# Patient Record
Sex: MALE | Race: BLACK OR AFRICAN AMERICAN | Employment: PART TIME | URBAN - METROPOLITAN AREA
[De-identification: names, ages, dates, MRNs, and addresses within clinical notes are randomized per-mention and may not be internally consistent; named-entity substitution may affect disease eponyms.]

---
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
## 2023-02-25 ENCOUNTER — HOSPITAL ENCOUNTER (EMERGENCY)
Age: 23
Discharge: HOME | End: 2023-02-25
Attending: EMERGENCY MEDICINE
Payer: MEDICAID

## 2023-02-25 VITALS
WEIGHT: 280 LBS | BODY MASS INDEX: 37.11 KG/M2 | RESPIRATION RATE: 16 BRPM | DIASTOLIC BLOOD PRESSURE: 82 MMHG | SYSTOLIC BLOOD PRESSURE: 131 MMHG | OXYGEN SATURATION: 97 % | TEMPERATURE: 97.5 F | HEIGHT: 73 IN | HEART RATE: 76 BPM

## 2023-02-25 DIAGNOSIS — M67.40 GANGLION CYST: ICD-10-CM

## 2023-02-25 DIAGNOSIS — M25.532 LEFT WRIST PAIN: Primary | ICD-10-CM

## 2023-02-25 PROCEDURE — 6370000000 HC RX 637 (ALT 250 FOR IP): Performed by: STUDENT IN AN ORGANIZED HEALTH CARE EDUCATION/TRAINING PROGRAM

## 2023-02-25 PROCEDURE — 99283 EMERGENCY DEPT VISIT LOW MDM: CPT

## 2023-02-25 RX ORDER — ACETAMINOPHEN 500 MG
1000 TABLET ORAL ONCE
Status: COMPLETED | OUTPATIENT
Start: 2023-02-25 | End: 2023-02-25

## 2023-02-25 RX ORDER — IBUPROFEN 800 MG/1
800 TABLET ORAL ONCE
Status: COMPLETED | OUTPATIENT
Start: 2023-02-25 | End: 2023-02-25

## 2023-02-25 RX ADMIN — IBUPROFEN 800 MG: 800 TABLET, FILM COATED ORAL at 19:47

## 2023-02-25 RX ADMIN — ACETAMINOPHEN 1000 MG: 500 TABLET ORAL at 19:46

## 2023-02-25 ASSESSMENT — ENCOUNTER SYMPTOMS
ABDOMINAL PAIN: 0
VOMITING: 0
SHORTNESS OF BREATH: 0
DIARRHEA: 0
NAUSEA: 0

## 2023-02-26 NOTE — ED PROVIDER NOTES
[address] [facility]  ED     Emergency Department     Faculty Attestation    I performed a history and physical examination of the patient and discussed management with the resident. I reviewed the residents note and agree with the documented findings and plan of care. Any areas of disagreement are noted on the chart. I was personally present for the key portions of any procedures. I have documented in the chart those procedures where I was not present during the key portions. I have reviewed the emergency nurses triage note. I agree with the chief complaint, past medical history, past surgical history, allergies, medications, social and family history as documented unless otherwise noted below. For Physician Assistant/ Nurse Practitioner cases/documentation I have personally evaluated this patient and have completed at least one if not all key elements of the E/M (history, physical exam, and MDM). Additional findings are as noted.     Ganglion cyst left wrist otherwise normal wrist exam will discharge home      Critical Care     none    Emery Romero MD, Soniya Brower  Attending Emergency  Physician           Emery Romero MD  02/25/23 8796

## 2023-02-26 NOTE — DISCHARGE INSTRUCTIONS
Please take over-the-counter Tylenol and Motrin as needed for pain control. Please call and schedule appoint with her primary care provider as well as general surgery. This is likely a ganglion cyst.  Monitor for signs of any infection. If you develop any severe fevers, severe swelling, severe worsening or pain please return the emergency department.

## 2023-02-26 NOTE — ED PROVIDER NOTES
[facility] ED  Emergency Department Encounter  Emergency Medicine Resident     Pt Name:Cory Jacob  MRN: [id number]  [place] 2000  Date of evaluation: 2/25/23  PCP:  Percy Ji MD  Note Started: 7:45 PM EST      CHIEF COMPLAINT       Chief Complaint   Patient presents with    Wrist Pain     LEFT       HISTORY OF PRESENT ILLNESS  (Location/Symptom, Timing/Onset, Context/Setting, Quality, Duration, Modifying Factors, Severity.)      Akeem L Saintclair Kiang is a 23 y.o. male who presents with left wrist pain has been going on for the past few months to weeks. Patient denies any trauma. States he is no swelling of the dorsal aspect of his left wrist.  States pain is worse with movement. Denies any numbness or tingling upper extremities. States he is noted some swelling in the posterior aspect. Denies any history of diabetes. PAST MEDICAL / SURGICAL / SOCIAL / FAMILY HISTORY      has a past medical history of Anxiety. has no past surgical history on file. Social History     Socioeconomic History    Marital status: Single     Spouse name: Not on file    Number of children: Not on file    Years of education: Not on file    Highest education level: Not on file   Occupational History    Not on file   Tobacco Use    Smoking status: Never    Smokeless tobacco: Never   Substance and Sexual Activity    Alcohol use: Not on file    Drug use: No    Sexual activity: Not on file   Other Topics Concern    Not on file   Social History Narrative    Not on file     Social Determinants of Health     Financial Resource Strain: Not on file   Food Insecurity: Not on file   Transportation Needs: Not on file   Physical Activity: Not on file   Stress: Not on file   Social Connections: Not on file   Intimate Partner Violence: Not on file   Housing Stability: Not on file       History reviewed. No pertinent family history. Allergies:  Patient has no known allergies.     Home Medications:  Prior to Admission medications    Not on File         REVIEW OF SYSTEMS       Review of Systems   Constitutional:  Negative for chills and fever. Respiratory:  Negative for shortness of breath. Cardiovascular:  Negative for chest pain. Gastrointestinal:  Negative for abdominal pain, diarrhea, nausea and vomiting. Musculoskeletal:         Left posterior wrist pain   Neurological:  Negative for weakness and numbness. PHYSICAL EXAM      INITIAL VITALS:   /82   Pulse 76   Temp 97.5 °F (36.4 °C) (Oral)   Resp 16   Ht 6' 1\" (1.854 m)   Wt 280 lb (127 kg)   SpO2 97%   BMI 36.94 kg/m²     Physical Exam  Constitutional:       General: He is not in acute distress. Appearance: He is not ill-appearing, toxic-appearing or diaphoretic. Cardiovascular:      Rate and Rhythm: Normal rate and regular rhythm. Heart sounds: No murmur heard. No friction rub. No gallop. Pulmonary:      Effort: No respiratory distress. Breath sounds: No stridor. No wheezing, rhonchi or rales. Chest:      Chest wall: No tenderness. Abdominal:      General: There is no distension. Palpations: There is no mass. Tenderness: There is no abdominal tenderness. There is no guarding or rebound. Hernia: No hernia is present. Musculoskeletal:      Comments: Swelling to the posterior aspect of the left wrist, there is a slightly well-circumscribed slightly mobile mass likely cystic in nature, no overlying warmth erythema, no fluctuance, mildly tender palpation, no swelling of the joint space, neurovascular tact bilateral upper extremities   Skin:     Capillary Refill: Capillary refill takes less than 2 seconds. Coloration: Skin is not jaundiced or pale. Findings: No bruising, erythema, lesion or rash. Neurological:      Mental Status: He is oriented to person, place, and time.          DDX/DIAGNOSTIC RESULTS / EMERGENCY DEPARTMENT COURSE / MDM     Medical Decision Making  27-year-old male presenting with left wrist pain and swelling. There is no swelling of joint space normal warmth erythema. Neurovascular intact. Low suspicion for abscess. Likely ganglion cyst.  No trauma no negation for x-ray imaging. Differential diagnosis of ganglion cyst, abscess, cellulitis, septic arthritis. No swelling overlying with erythema low sufficient for septic arthritis. Normal vital signs. Likely ganglion cyst will discharge home with general surgery follow-up. Patient minimal discharge. Risk  OTC drugs. Prescription drug management. EMERGENCY DEPARTMENT COURSE:           PROCEDURES:      CONSULTS:  None    CRITICAL CARE:  There was significant risk of life threatening deterioration of patient's condition requiring my direct management. Critical care time  minutes, excluding any documented procedures. FINAL IMPRESSION      1. Left wrist pain    2.  Ganglion cyst          DISPOSITION / PLAN     DISPOSITION Decision To Discharge 02/25/2023 07:48:53 PM      PATIENT REFERRED TO:  Samuel Vogt MD  [address]  [phone]    Schedule an appointment as soon as possible for a visit       [facility] ED  [address] [address]  [phone]    If symptoms worsen    [place] [address] [id number] [phone]  Schedule an appointment as soon as possible for a visit       DISCHARGE MEDICATIONS:  Discharge Medication List as of 2/25/2023  7:54 PM          Elizabeth Blanca DO  Emergency Medicine Resident    (Please note that portions of thisnote were completed with a voice recognition program.  Efforts were made to edit the dictations but occasionally words are mis-transcribed.)        Dicie Sandifer, DO  Resident  02/25/23 2032

## 2023-02-26 NOTE — ED TRIAGE NOTES
PT PRESENTS TO ED WITH C/O LEFT WRIST PAIN X 1 MONTH . PT DENIES INJURY OR FALL AT THIS TIME. PT STATES HE COMES IN TODAY FOR INCREASED PAIN. PT DENIES TAKING OTC MEDICATIONS FOR PAIN RELIEF. PT STATES IT IS MORE PAINFUL WITH MOVEMENT.

## 2023-07-23 ENCOUNTER — APPOINTMENT (OUTPATIENT)
Dept: GENERAL RADIOLOGY | Age: 23
End: 2023-07-23
Payer: COMMERCIAL

## 2023-07-23 ENCOUNTER — HOSPITAL ENCOUNTER (EMERGENCY)
Age: 23
Discharge: LEFT BEFORE DISCHARGE | End: 2023-07-24
Attending: EMERGENCY MEDICINE
Payer: COMMERCIAL

## 2023-07-23 VITALS
WEIGHT: 260 LBS | OXYGEN SATURATION: 96 % | BODY MASS INDEX: 34.46 KG/M2 | RESPIRATION RATE: 16 BRPM | SYSTOLIC BLOOD PRESSURE: 114 MMHG | DIASTOLIC BLOOD PRESSURE: 71 MMHG | HEIGHT: 73 IN | HEART RATE: 74 BPM | TEMPERATURE: 97 F
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
## 2023-07-23 DIAGNOSIS — M79.18 MUSCULOSKELETAL PAIN: ICD-10-CM

## 2023-07-23 PROCEDURE — 73502 X-RAY EXAM HIP UNI 2-3 VIEWS: CPT

## 2023-07-23 PROCEDURE — 72100 X-RAY EXAM L-S SPINE 2/3 VWS: CPT

## 2023-07-23 PROCEDURE — 6370000000 HC RX 637 (ALT 250 FOR IP)

## 2023-07-23 PROCEDURE — 99283 EMERGENCY DEPT VISIT LOW MDM: CPT

## 2023-07-23 RX ORDER — ACETAMINOPHEN 325 MG/1
650 TABLET ORAL ONCE
Status: COMPLETED | OUTPATIENT
Start: 2023-07-23 | End: 2023-07-23

## 2023-07-23 RX ORDER — IBUPROFEN 400 MG/1
400 TABLET ORAL ONCE
Status: COMPLETED | OUTPATIENT
Start: 2023-07-23 | End: 2023-07-23

## 2023-07-23 RX ORDER — LIDOCAINE 4 G/G
1 PATCH TOPICAL ONCE
Status: DISCONTINUED | OUTPATIENT
Start: 2023-07-24 | End: 2023-07-24 | Stop reason: HOSPADM

## 2023-07-23 RX ADMIN — IBUPROFEN 400 MG: 400 TABLET, FILM COATED ORAL at 22:36

## 2023-07-23 RX ADMIN — ACETAMINOPHEN 650 MG: 325 TABLET ORAL at 22:36

## 2023-07-23 ASSESSMENT — PAIN SCALES - GENERAL
PAINLEVEL_OUTOF10: 10
PAINLEVEL_OUTOF10: 1

## 2023-07-23 ASSESSMENT — PAIN DESCRIPTION - LOCATION
LOCATION: BACK
LOCATION: BACK;HIP
LOCATION: BACK

## 2023-07-23 ASSESSMENT — PAIN - FUNCTIONAL ASSESSMENT: PAIN_FUNCTIONAL_ASSESSMENT: 0-10

## 2023-07-23 ASSESSMENT — PAIN DESCRIPTION - ORIENTATION
ORIENTATION: LOWER
ORIENTATION: RIGHT

## 2023-07-24 ENCOUNTER — HOSPITAL ENCOUNTER (EMERGENCY)
Age: 23
Discharge: HOME | End: 2023-07-25
Attending: EMERGENCY MEDICINE
Payer: MEDICAID

## 2023-07-24 ENCOUNTER — APPOINTMENT (OUTPATIENT)
Dept: CT IMAGING | Age: 23
End: 2023-07-24
Payer: MEDICAID
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
## 2023-07-24 DIAGNOSIS — S39.012D STRAIN OF LUMBAR REGION, SUBSEQUENT ENCOUNTER: Primary | ICD-10-CM

## 2023-07-24 PROCEDURE — 70450 CT HEAD/BRAIN W/O DYE: CPT

## 2023-07-24 PROCEDURE — 72125 CT NECK SPINE W/O DYE: CPT

## 2023-07-24 PROCEDURE — 71250 CT THORAX DX C-: CPT

## 2023-07-24 PROCEDURE — 6370000000 HC RX 637 (ALT 250 FOR IP)

## 2023-07-24 PROCEDURE — 99284 EMERGENCY DEPT VISIT MOD MDM: CPT

## 2023-07-24 PROCEDURE — 6370000000 HC RX 637 (ALT 250 FOR IP): Performed by: STUDENT IN AN ORGANIZED HEALTH CARE EDUCATION/TRAINING PROGRAM

## 2023-07-24 RX ORDER — ACETAMINOPHEN 325 MG/1
325 TABLET, FILM COATED ORAL EVERY 6 HOURS PRN
Qty: 20 TABLET | Refills: 0 | Status: SHIPPED | OUTPATIENT
Start: 2023-07-24 | End: 2023-07-29

## 2023-07-24 RX ORDER — OXYCODONE HYDROCHLORIDE AND ACETAMINOPHEN 5; 325 MG/1; MG/1
1 TABLET ORAL ONCE
Status: COMPLETED | OUTPATIENT
Start: 2023-07-24 | End: 2023-07-24

## 2023-07-24 RX ORDER — IBUPROFEN 600 MG/1
600 TABLET ORAL 3 TIMES DAILY PRN
Qty: 15 TABLET | Refills: 0 | Status: SHIPPED | OUTPATIENT
Start: 2023-07-24 | End: 2023-07-29

## 2023-07-24 RX ORDER — LIDOCAINE 50 MG/G
1 PATCH TOPICAL DAILY
Qty: 5 PATCH | Refills: 0 | Status: SHIPPED | OUTPATIENT
Start: 2023-07-24 | End: 2023-07-29

## 2023-07-24 RX ADMIN — OXYCODONE HYDROCHLORIDE AND ACETAMINOPHEN 1 TABLET: 5; 325 TABLET ORAL at 21:09

## 2023-07-24 ASSESSMENT — ENCOUNTER SYMPTOMS
VOMITING: 0
COLOR CHANGE: 0
COUGH: 0
ABDOMINAL PAIN: 0
NAUSEA: 0
VOMITING: 0
BACK PAIN: 1
SINUS PAIN: 0
CHEST TIGHTNESS: 0
SHORTNESS OF BREATH: 0
SHORTNESS OF BREATH: 0
NAUSEA: 0
BACK PAIN: 1

## 2023-07-24 ASSESSMENT — PAIN - FUNCTIONAL ASSESSMENT: PAIN_FUNCTIONAL_ASSESSMENT: 0-10

## 2023-07-24 ASSESSMENT — PAIN SCALES - GENERAL: PAINLEVEL_OUTOF10: 7
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
## 2023-07-24 NOTE — ED NOTES
Patient states he would like to leave as he does not want to wait for CT scans. Dr. Terrell Barnett notified. Patient provided AMA paperwork and instructed on risks of leaving AMA by Dr. Terrell Barnett. Patient A+Ox4 and competent to make independent decisions.       Dio Baez RN  07/24/23 [id number]

## 2023-07-25 VITALS
DIASTOLIC BLOOD PRESSURE: 63 MMHG | BODY MASS INDEX: 36.41 KG/M2 | SYSTOLIC BLOOD PRESSURE: 115 MMHG | OXYGEN SATURATION: 98 % | TEMPERATURE: 97.3 F | WEIGHT: 274.69 LBS | HEIGHT: 73 IN | RESPIRATION RATE: 16 BRPM | HEART RATE: 82 BPM

## 2023-07-25 RX ORDER — METHOCARBAMOL 750 MG/1
750 TABLET, FILM COATED ORAL 3 TIMES DAILY
Qty: 15 TABLET | Refills: 0 | Status: SHIPPED | OUTPATIENT
Start: 2023-07-25 | End: 2023-07-30
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
## 2024-04-24 ENCOUNTER — HOSPITAL ENCOUNTER (EMERGENCY)
Age: 24
Discharge: HOME | End: 2024-04-24
Attending: EMERGENCY MEDICINE
Payer: MEDICAID

## 2024-04-24 VITALS
DIASTOLIC BLOOD PRESSURE: 78 MMHG | OXYGEN SATURATION: 99 % | HEART RATE: 78 BPM | BODY MASS INDEX: 36.15 KG/M2 | RESPIRATION RATE: 16 BRPM | SYSTOLIC BLOOD PRESSURE: 127 MMHG | TEMPERATURE: 98.2 F | WEIGHT: 274 LBS

## 2024-04-24 DIAGNOSIS — L73.9 FOLLICULITIS: Primary | ICD-10-CM

## 2024-04-24 PROCEDURE — 99283 EMERGENCY DEPT VISIT LOW MDM: CPT

## 2024-04-24 PROCEDURE — 6370000000 HC RX 637 (ALT 250 FOR IP): Performed by: EMERGENCY MEDICINE

## 2024-04-24 RX ORDER — CEPHALEXIN 250 MG/1
500 CAPSULE ORAL ONCE
Status: COMPLETED | OUTPATIENT
Start: 2024-04-24 | End: 2024-04-24

## 2024-04-24 RX ORDER — IBUPROFEN 800 MG/1
800 TABLET ORAL ONCE
Status: COMPLETED | OUTPATIENT
Start: 2024-04-24 | End: 2024-04-24

## 2024-04-24 RX ORDER — IBUPROFEN 800 MG/1
800 TABLET ORAL EVERY 8 HOURS PRN
Qty: 30 TABLET | Refills: 0 | Status: SHIPPED | OUTPATIENT
Start: 2024-04-24

## 2024-04-24 RX ORDER — CEPHALEXIN 500 MG/1
500 CAPSULE ORAL 4 TIMES DAILY
Qty: 27 CAPSULE | Refills: 0 | Status: SHIPPED | OUTPATIENT
Start: 2024-04-24 | End: 2024-05-01

## 2024-04-24 RX ADMIN — IBUPROFEN 800 MG: 800 TABLET, FILM COATED ORAL at 08:48

## 2024-04-24 RX ADMIN — CEPHALEXIN 500 MG: 250 CAPSULE ORAL at 08:48

## 2024-04-24 ASSESSMENT — ENCOUNTER SYMPTOMS
SORE THROAT: 0
EYE DISCHARGE: 0
COLOR CHANGE: 0
EYE PAIN: 0
SHORTNESS OF BREATH: 0
VOMITING: 0
NAUSEA: 0
COUGH: 0

## 2024-04-24 ASSESSMENT — PAIN - FUNCTIONAL ASSESSMENT: PAIN_FUNCTIONAL_ASSESSMENT: NONE - DENIES PAIN

## 2024-04-24 NOTE — ED PROVIDER NOTES
[facility] ED  eMERGENCY dEPARTMENT eNCOUnter      Pt Name: Cory Singer  MRN: [id number]  Birthdate 2000  Date of evaluation: 4/24/24      CHIEF COMPLAINT       Chief Complaint   Patient presents with    Abscess     Left near eye         HISTORY OF PRESENT ILLNESS    Cory Singer is a 24 y.o. male who presents with a bump to his left cheek.  The patient says it started off as a pimple couple of days ago and now has gotten larger.  He says it is mildly painful.  He denies any dental pain.  He denies any pain to the eye itself.  He says he has had some chills but denies any fever, nausea or vomiting.    Location/Symptom: lump to cheek  Timing/Onset: a couple days ago  Context/Setting: no significant hx  Quality: tender  Duration: a couple days  Modifying Factors: none  Severity: moderate      REVIEW OF SYSTEMS       Review of Systems   Constitutional:  Positive for chills. Negative for fever.   HENT:  Negative for congestion, ear pain and sore throat.    Eyes:  Negative for pain, discharge and visual disturbance.   Respiratory:  Negative for cough and shortness of breath.    Cardiovascular:  Negative for chest pain.   Gastrointestinal:  Negative for nausea and vomiting.   Musculoskeletal:  Negative for neck pain.   Skin:  Negative for color change and rash.   Neurological:  Negative for dizziness, light-headedness and headaches.       PAST MEDICAL HISTORY    has a past medical history of Anxiety.    SURGICAL HISTORY      has no past surgical history on file.    CURRENT MEDICATIONS       Discharge Medication List as of 4/24/2024  8:47 AM        CONTINUE these medications which have NOT CHANGED    Details   TYLENOL 325 MG tablet Take 1 tablet by mouth every 6 hours as needed for Pain, Disp-20 tablet, R-0, DAWPrint             ALLERGIES     has No Known Allergies.    FAMILY HISTORY     has no family status information on file.      family history is not on file.    SOCIAL HISTORY      reports that

## 2024-04-24 NOTE — ED NOTES
Pt presented to ED via triage for the complaint of abscess on zygomatic bone. Pt states he had a pimple there and it has become bigger in 1 week. Pt denies drainage. Pt alert and oriented x 4. RR even and non labored. Pt ambulated with steady gait.

## 2024-04-24 NOTE — DISCHARGE INSTRUCTIONS
Read and follow all instructions.    Take medication as prescribed.    Return to the ER if symptoms worsen or if you become concerned for any reason.

## 2025-05-16 ENCOUNTER — HOSPITAL ENCOUNTER (EMERGENCY)
Age: 25
Discharge: HOME | End: 2025-05-16
Attending: EMERGENCY MEDICINE
Payer: MEDICAID

## 2025-05-16 ENCOUNTER — APPOINTMENT (OUTPATIENT)
Dept: GENERAL RADIOLOGY | Age: 25
End: 2025-05-16
Payer: MEDICAID

## 2025-05-16 VITALS
BODY MASS INDEX: 36.59 KG/M2 | HEART RATE: 96 BPM | WEIGHT: 277.34 LBS | OXYGEN SATURATION: 100 % | SYSTOLIC BLOOD PRESSURE: 138 MMHG | RESPIRATION RATE: 18 BRPM | DIASTOLIC BLOOD PRESSURE: 85 MMHG | TEMPERATURE: 98.6 F

## 2025-05-16 DIAGNOSIS — S62.234B: Primary | ICD-10-CM

## 2025-05-16 PROCEDURE — 90471 IMMUNIZATION ADMIN: CPT

## 2025-05-16 PROCEDURE — 6370000000 HC RX 637 (ALT 250 FOR IP)

## 2025-05-16 PROCEDURE — 73130 X-RAY EXAM OF HAND: CPT

## 2025-05-16 PROCEDURE — 90715 TDAP VACCINE 7 YRS/> IM: CPT

## 2025-05-16 PROCEDURE — 12001 RPR S/N/AX/GEN/TRNK 2.5CM/<: CPT | Performed by: EMERGENCY MEDICINE

## 2025-05-16 PROCEDURE — 6360000002 HC RX W HCPCS

## 2025-05-16 PROCEDURE — 99283 EMERGENCY DEPT VISIT LOW MDM: CPT | Performed by: EMERGENCY MEDICINE

## 2025-05-16 RX ORDER — OXYCODONE HYDROCHLORIDE 5 MG/1
5 TABLET ORAL ONCE
Refills: 0 | Status: COMPLETED | OUTPATIENT
Start: 2025-05-16 | End: 2025-05-16

## 2025-05-16 RX ORDER — ACETAMINOPHEN 500 MG
1000 TABLET ORAL ONCE
Status: COMPLETED | OUTPATIENT
Start: 2025-05-16 | End: 2025-05-16

## 2025-05-16 RX ORDER — IBUPROFEN 800 MG/1
800 TABLET, FILM COATED ORAL ONCE
Status: COMPLETED | OUTPATIENT
Start: 2025-05-16 | End: 2025-05-16

## 2025-05-16 RX ORDER — LIDOCAINE HYDROCHLORIDE 10 MG/ML
20 INJECTION, SOLUTION INFILTRATION; PERINEURAL ONCE
Status: COMPLETED | OUTPATIENT
Start: 2025-05-16 | End: 2025-05-16

## 2025-05-16 RX ADMIN — IBUPROFEN 800 MG: 800 TABLET, FILM COATED ORAL at 16:02

## 2025-05-16 RX ADMIN — ACETAMINOPHEN 1000 MG: 500 TABLET ORAL at 16:02

## 2025-05-16 RX ADMIN — LIDOCAINE HYDROCHLORIDE 20 ML: 10 INJECTION, SOLUTION INFILTRATION; PERINEURAL at 17:58

## 2025-05-16 RX ADMIN — OXYCODONE 5 MG: 5 TABLET ORAL at 16:51

## 2025-05-16 RX ADMIN — TETANUS TOXOID, REDUCED DIPHTHERIA TOXOID AND ACELLULAR PERTUSSIS VACCINE, ADSORBED 0.5 ML: 5; 2.5; 8; 8; 2.5 SUSPENSION INTRAMUSCULAR at 16:52

## 2025-05-16 RX ADMIN — AMOXICILLIN AND CLAVULANATE POTASSIUM 1 TABLET: 875; 125 TABLET, FILM COATED ORAL at 17:58

## 2025-05-16 NOTE — DISCHARGE INSTRUCTIONS
You were seen in the emergency department today after sustaining a dog bite to your right thumb.  We did find on x-ray that you have a broken bone/fracture of your thumb.  This is considered an open fracture because of the laceration.  This is a very serious injury, and you need to take antibiotics for this.  We gave you the first dose of the antibiotics here, and you will need to take an antibiotic called Augmentin twice a day for the next 10 days to prevent infection.  It is very important that you take this antibiotic and do not miss any doses.  It is also very important that you follow-up with a hand specialist, as they will monitor the progression of your healing.  I did explain to you that because the wound was gaping, we placed very loose stitches in the wound, but the intention was not to close the wound.  Fully closing the wound puts you at high risk for infection, this is why the wound is so loosely closed with stitches.  You need to see the hand surgeon to have the stitches removed.  You can use Tylenol and ibuprofen for pain and swelling.  Please return back to the emergency department as soon as possible if you develop any fever, redness, increased swelling, warmth, or if pus drainage from the wound.  This could be a sign of an infection.

## 2025-05-16 NOTE — ED PROVIDER NOTES
[facility] EMERGENCY DEPARTMENT     Emergency Department     Faculty Attestation        I performed a history and physical examination of the patient and discussed management with the resident. I reviewed the resident’s note and agree with the documented findings and plan of care. Any areas of disagreement are noted on the chart. I was personally present for the key portions of any procedures. I have documented in the chart those procedures where I was not present during the key portions. I have reviewed the emergency nurses triage note. I agree with the chief complaint, past medical history, past surgical history, allergies, medications, social and family history as documented unless otherwise noted below.    For mid-level providers such as nurse practitioners as well as physicians assistants:    I have personally seen and evaluated the patient.    I find the patient's history and physical exam are consistent with NP/PA documentation.  I agree with the care provided, treatment rendered, disposition, & follow-up plan.     Additional findings are as noted.    Vital Signs: /85   Pulse 96   Temp 98.6 °F (37 °C) (Oral)   Resp 18   Wt 125.8 kg (277 lb 5.4 oz)   SpO2 100%   BMI 36.59 kg/m²   PCP:  No primary care provider on file.    Pertinent Comments:     Gaping laceration of the base of the right thumb he is right-hand dominant he has free range of motion to the thumb.  I will check x-ray to make sure there is no evidence of fracture, wound irrigation, loose repair      Critical Care  None          Brandon Myers MD    Attending Emergency Medicine Physician            Hany Myers MD  05/16/25 2146
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
## 2025-05-29 ENCOUNTER — TELEPHONE (OUTPATIENT)
Age: 25
End: 2025-05-29

## 2025-05-29 NOTE — TELEPHONE ENCOUNTER
Pt needs ED f/u for open nondisplaced fracture of base of first metacarpal bone of right hand, date of injury 5/16/25 next available appt 6/10

## 2025-06-03 ENCOUNTER — OFFICE VISIT (OUTPATIENT)
Age: 25
End: 2025-06-03

## 2025-06-03 VITALS
HEIGHT: 73 IN | WEIGHT: 277 LBS | HEART RATE: 73 BPM | DIASTOLIC BLOOD PRESSURE: 91 MMHG | SYSTOLIC BLOOD PRESSURE: 148 MMHG | BODY MASS INDEX: 36.71 KG/M2

## 2025-06-03 DIAGNOSIS — S61.011A LACERATION OF RIGHT THUMB WITHOUT FOREIGN BODY WITHOUT DAMAGE TO NAIL, INITIAL ENCOUNTER: Primary | ICD-10-CM

## 2025-06-03 PROCEDURE — 99202 OFFICE O/P NEW SF 15 MIN: CPT | Performed by: PLASTIC SURGERY

## 2025-06-03 PROCEDURE — 99203 OFFICE O/P NEW LOW 30 MIN: CPT | Performed by: PLASTIC SURGERY

## 2025-06-03 NOTE — PROGRESS NOTES
[facility]   New Patient Visit    CHIEF COMPLAINT:    Chief Complaint   Patient presents with    New Patient     open nondisplaced fracture of right base of first metacarpal bone       HISTORY OF PRESENT ILLNESS:      The patient is a 25 y.o. male who is being seen for consultation and evaluation of a laceration to the base of the right thumb as a result of a dog bite.  The patient initially presented to the emergency department on 5/16/2025 for this complaint.  Since then the patient has been dressing the wound with bacitracin dressings until approximately 1 week ago in which they left their laceration open to air.  Since that time the patient denies experiencing any drainage or having any feelings of fevers or fatigue over that time.    Review of Systems   Constitutional: Negative for fever and chills.   HENT: Negative for congestion.    Eyes: Negative for blurred vision and double vision.   Respiratory: Negative for cough, shortness of breath and wheezing.    Cardiovascular: Negative for chest pain and palpitations.   Gastrointestinal: Negative for nausea. Negative for vomiting.   Musculoskeletal: Positive for myalgias and joint pain.   Skin: Negative for itching and rash.   Neurological: Negative for dizziness, sensory change and headaches.   Psychiatric/Behavioral: Negative for depression and suicidal ideas.     Past Medical History:    Past Medical History:   Diagnosis Date    Anxiety        Past SurgicalHistory:    No past surgical history on file.    Current Medications:   Current Outpatient Medications   Medication Sig Dispense Refill    ibuprofen (ADVIL;MOTRIN) 800 MG tablet Take 1 tablet by mouth every 8 hours as needed for Pain (Patient not taking: Reported on 6/3/2025) 30 tablet 0    TYLENOL 325 MG tablet Take 1 tablet by mouth every 6 hours as needed for Pain 20 tablet 0     No current facility-administered medications for this visit.       Allergies:    Patient has no known